# Patient Record
Sex: MALE | Race: WHITE | Employment: OTHER | URBAN - METROPOLITAN AREA
[De-identification: names, ages, dates, MRNs, and addresses within clinical notes are randomized per-mention and may not be internally consistent; named-entity substitution may affect disease eponyms.]

---

## 2021-01-22 ENCOUNTER — IMMUNIZATIONS (OUTPATIENT)
Dept: FAMILY MEDICINE CLINIC | Facility: HOSPITAL | Age: 81
End: 2021-01-22

## 2021-01-22 DIAGNOSIS — Z23 ENCOUNTER FOR IMMUNIZATION: Primary | ICD-10-CM

## 2021-01-22 PROCEDURE — 0001A SARS-COV-2 / COVID-19 MRNA VACCINE (PFIZER-BIONTECH) 30 MCG: CPT

## 2021-01-22 PROCEDURE — 91300 SARS-COV-2 / COVID-19 MRNA VACCINE (PFIZER-BIONTECH) 30 MCG: CPT

## 2021-02-12 ENCOUNTER — IMMUNIZATIONS (OUTPATIENT)
Dept: FAMILY MEDICINE CLINIC | Facility: HOSPITAL | Age: 81
End: 2021-02-12

## 2021-02-12 DIAGNOSIS — Z23 ENCOUNTER FOR IMMUNIZATION: Primary | ICD-10-CM

## 2021-02-12 PROCEDURE — 0002A SARS-COV-2 / COVID-19 MRNA VACCINE (PFIZER-BIONTECH) 30 MCG: CPT

## 2021-02-12 PROCEDURE — 91300 SARS-COV-2 / COVID-19 MRNA VACCINE (PFIZER-BIONTECH) 30 MCG: CPT

## 2023-05-03 ENCOUNTER — CONSULT (OUTPATIENT)
Dept: PAIN MEDICINE | Facility: CLINIC | Age: 83
End: 2023-05-03

## 2023-05-03 ENCOUNTER — APPOINTMENT (OUTPATIENT)
Dept: RADIOLOGY | Facility: CLINIC | Age: 83
End: 2023-05-03

## 2023-05-03 VITALS
BODY MASS INDEX: 29.33 KG/M2 | HEIGHT: 69 IN | HEART RATE: 83 BPM | TEMPERATURE: 98.1 F | WEIGHT: 198 LBS | DIASTOLIC BLOOD PRESSURE: 66 MMHG | SYSTOLIC BLOOD PRESSURE: 128 MMHG

## 2023-05-03 DIAGNOSIS — M25.552 LEFT HIP PAIN: ICD-10-CM

## 2023-05-03 DIAGNOSIS — M54.50 CHRONIC LEFT-SIDED LOW BACK PAIN WITHOUT SCIATICA: ICD-10-CM

## 2023-05-03 DIAGNOSIS — G89.29 CHRONIC LEFT-SIDED LOW BACK PAIN WITHOUT SCIATICA: ICD-10-CM

## 2023-05-03 DIAGNOSIS — M25.552 LEFT HIP PAIN: Primary | ICD-10-CM

## 2023-05-03 RX ORDER — ASPIRIN 325 MG
TABLET ORAL EVERY 24 HOURS
COMMUNITY

## 2023-05-03 NOTE — PROGRESS NOTES
Assessment  1  Left hip pain    2  Chronic left-sided low back pain without sciatica        Plan      At this point the patient's pain persists despite time, relative rest, activity modification, and nonsteroidal anti-inflammatories  Chiropractic treatment aggravate his symptoms and he has tried a course of oral steroids which did reduce his pain  His pain is significantly interfering with his daily living activities  It is appropriate to order radiographs to rule out any significant etiology of his symptoms  We did discuss starting a nonsteroidal inflammatories but he does not like take medications we will hold off until we obtain the x-ray results  Once we obtain results, I will follow-up with the patient, review the results and current symptoms, and discuss the next steps of the treatment plan  He understands to call the office if his pain worsens  My impressions and treatment recommendations were discussed in detail with the patient who verbalized understanding and had no further questions  Discharge instructions were provided  I personally saw and examined the patient and I agree with the above discussed plan of care  This note is created using dictation transcription  It may contain typographical errors, grammatical errors, improperly dictated words, background noise and other errors  Orders Placed This Encounter   Procedures    X-ray lumbar spine 2 or 3 views     Standing Status:   Future     Standing Expiration Date:   5/3/2027     Scheduling Instructions:      Bring along any outside films relating to this procedure   XR hip/pelv 2-3 vws left if performed     Standing Status:   Future     Standing Expiration Date:   5/3/2027     Scheduling Instructions:      Bring along any outside films relating to this procedure             New Medications Ordered This Visit   Medications    aspirin 325 mg tablet     Sig: every 24 hours     Referred By: Westley Ambrosio DO  History of Present Amina Solomon is a 79 y o  male with 3-month history of severe left-sided low back pain  His pain occurred after bowling he went to the chiropractor treatment which completely insignificant exacerbated the symptoms  He went to the emergency department and follow-up with his primary care physician  He has tried nonsteroidal anti-inflammatories and oral steroids this point his pain is mild  It is only occurs when he is moving but denies any weakness  Describes as burning in the left side of his low back without any radiation and no bowel or bladder dysfunction  Lying down and bending increases symptoms while relaxation reduces pain  I have personally reviewed and/or updated the patient's past medical history, past surgical history, family history, social history, current medications, allergies, and vital signs today  Review of Systems   Constitutional: Negative for fever and unexpected weight change  HENT: Negative for trouble swallowing  Eyes: Negative for visual disturbance  Respiratory: Negative for shortness of breath and wheezing  Cardiovascular: Negative for chest pain and palpitations  Gastrointestinal: Negative for constipation, diarrhea, nausea and vomiting  Endocrine: Negative for cold intolerance, heat intolerance and polydipsia  Genitourinary: Negative for difficulty urinating and frequency  Musculoskeletal: Negative for arthralgias, gait problem, joint swelling and myalgias  Skin: Negative for rash  Neurological: Negative for dizziness, seizures, syncope, weakness and headaches  Hematological: Does not bruise/bleed easily  Psychiatric/Behavioral: Negative for dysphoric mood  All other systems reviewed and are negative  There is no problem list on file for this patient  History reviewed  No pertinent past medical history  History reviewed  No pertinent surgical history  History reviewed  No pertinent family history      Social History "    Occupational History    Not on file   Tobacco Use    Smoking status: Former     Types: Cigarettes    Smokeless tobacco: Never   Vaping Use    Vaping Use: Never used   Substance and Sexual Activity    Alcohol use: Never    Drug use: Never    Sexual activity: Not Currently       Current Outpatient Medications on File Prior to Visit   Medication Sig    aspirin 325 mg tablet every 24 hours     No current facility-administered medications on file prior to visit  Allergies   Allergen Reactions    Red Dye - Food Allergy Hives    Wound Dressing Adhesive Hives    Mixed Ragweed Rash and Sneezing       Physical Exam    /66 (BP Location: Left arm, Patient Position: Sitting, Cuff Size: Standard)   Pulse 83   Temp 98 1 °F (36 7 °C)   Ht 5' 9\" (1 753 m)   Wt 89 8 kg (198 lb)   BMI 29 24 kg/m²     Constitutional: normal, well developed, well nourished, alert, in no distress and non-toxic and no overt pain behavior  Eyes: anicteric  HEENT: grossly intact  Neck: supple, symmetric, trachea midline and no masses   Pulmonary:even and unlabored  Cardiovascular:No edema or pitting edema present  Skin:Normal without rashes or lesions and well hydrated  Psychiatric:Mood and affect appropriate  Neurologic:Cranial Nerves II-XII grossly intact  Musculoskeletal:normal,  Inspection: Normal station and gait  Normal lumbar lordotic curve with no significant scoliosis or spinal step-off  Skin intact without erythema  No gross mass or muscle atrophy  Palpation: There is no tenderness to palpation overlying the sacroiliac joint as well as the ischial bursa bilateral  No significant tenderness over the greater trochanteric bursa bilaterally  Motor/Strength: No focal motor deficit appreciated the lower limbs  Reflexes: Deep tendon reflex are diminished but symmetrical bilateral patellar and Achilles  Sensation: Sensation intact to light touch and pinprick in the bilateral lower limbs   Proprioception is intact at " bilateral hallux  Maneuvers: Negative bilateral straight leg raising  Negative Manny's maneuver  Imaging  CT Abdominal Pelvis @ [facility] 4-16-23  IMPRESSION:   No acute findings  Remainder as above  Dictated and authenticated by: Maira Haynes MD On 04/16/2023  07:46:25    Preliminary interpretation was provided by [facility]  I personally reviewed the examination and agree with the reported findings  In the preceding 12 month period prior to the current study, the patient has received 1 CT scans and cardiac nuclear medicine myocardial perfusion (nuclear stress test) studies  I have personally reviewed pertinent films in PACS

## 2023-06-05 ENCOUNTER — OFFICE VISIT (OUTPATIENT)
Dept: PAIN MEDICINE | Facility: CLINIC | Age: 83
End: 2023-06-05
Payer: COMMERCIAL

## 2023-06-05 VITALS
TEMPERATURE: 97.8 F | WEIGHT: 200 LBS | HEART RATE: 76 BPM | HEIGHT: 69 IN | SYSTOLIC BLOOD PRESSURE: 126 MMHG | BODY MASS INDEX: 29.62 KG/M2 | DIASTOLIC BLOOD PRESSURE: 80 MMHG

## 2023-06-05 DIAGNOSIS — M47.816 LUMBAR SPONDYLOSIS: Primary | ICD-10-CM

## 2023-06-05 DIAGNOSIS — M51.36 LUMBAR DEGENERATIVE DISC DISEASE: ICD-10-CM

## 2023-06-05 PROCEDURE — 99214 OFFICE O/P EST MOD 30 MIN: CPT | Performed by: PHYSICIAN ASSISTANT

## 2023-06-05 NOTE — PROGRESS NOTES
Assessment:  1  Lumbar spondylosis    2  Lumbar degenerative disc disease        Plan:  While the patient was in the office today, I did have a thorough conversation regarding their chronic pain syndrome, medication management, and treatment plan options  Today I spent time reviewing the recent x-rays of the lumbar spine and left hip  He continues with pain primarily in the left lumbar region without any radicular pain pattern  He states that since our last visit, his pain has eased up some and he finds relief with the counter anti-inflammatories  Spent some time discussing the neck steps in his treatment plan which could consist of either injection therapy to address the axial component with diagnostic medial branch blocks  The moment I mentioned injection therapy the patient states that he did not want to even discuss this modality of treatment  He has exhausted conservative efforts with chiropractic care but he states that he wishes to resume that type of therapy  I have placed an order for an MRI of the lumbar spine to further evaluate the discs and nerve roots  The patient states that he will likely just take a watch and wait approach before scheduling the MRI  At this point the patient will follow-up with us on a as needed basis if the pain changes or worsens  The patient was advised to contact the office should their symptoms worsen in the interim  The patient was agreeable and verbalized an understanding  History of Present Illness: The patient is a 79 y o  male last seen on 5/3/2023 who presents for a follow up office visit in regards to low back pain  The patient currently reports left low back pain that he presently rates a 2 out of 10 on the pain scale and describes it as a constant dull and aching pain with intermittent paresthesias in that same region  He has occasional referred symptoms into the left hip but denies any lower extremity pain, paresthesias or weakness    Last "visit was his initial consultation at which point Dr Deya Wren ordered x-rays of the left hip and lumbar spine  Left hip x-ray revealed mild osteoarthritis and the lumbar spine x-ray revealed facet arthropathy at L4-5 and L5-S1 with intervertebral disc space narrowing at all levels  He states that since his last visit with us his pain has eased up some  I have personally reviewed and/or updated the patient's past medical history, past surgical history, family history, social history, current medications, allergies, and vital signs today  Review of Systems:    Review of Systems   Respiratory: Negative for shortness of breath  Cardiovascular: Negative for chest pain  Gastrointestinal: Negative for constipation, diarrhea, nausea and vomiting  Musculoskeletal: Negative for arthralgias, gait problem, joint swelling and myalgias  Skin: Negative for rash  Neurological: Positive for dizziness  Negative for seizures and weakness  All other systems reviewed and are negative  History reviewed  No pertinent past medical history  History reviewed  No pertinent surgical history  History reviewed  No pertinent family history      Social History     Occupational History   • Not on file   Tobacco Use   • Smoking status: Former     Types: Cigarettes   • Smokeless tobacco: Never   Vaping Use   • Vaping Use: Never used   Substance and Sexual Activity   • Alcohol use: Never   • Drug use: Never   • Sexual activity: Not Currently         Current Outpatient Medications:   •  aspirin 325 mg tablet, every 24 hours, Disp: , Rfl:     Allergies   Allergen Reactions   • Red Dye - Food Allergy Hives   • Wound Dressing Adhesive Hives   • Mixed Ragweed Rash and Sneezing       Physical Exam:    /80 (BP Location: Left arm, Patient Position: Sitting, Cuff Size: Standard)   Pulse 76   Temp 97 8 °F (36 6 °C)   Ht 5' 9\" (1 753 m)   Wt 90 7 kg (200 lb)   BMI 29 53 kg/m²     Constitutional:normal, well developed, " well nourished, alert, in no distress and non-toxic and no overt pain behavior  Eyes:anicteric  HEENT:grossly intact  Neck:supple, symmetric, trachea midline and no masses   Pulmonary:even and unlabored  Cardiovascular:No edema or pitting edema present  Skin:Normal without rashes or lesions and well hydrated  Psychiatric:Mood and affect appropriate  Neurologic:Cranial Nerves II-XII grossly intact  Musculoskeletal: Tightness along the left lumbar paraspinal muscles and left lumbar facet joints, limited range of motion with extension        Imaging  MRI lumbar spine without contrast    (Results Pending)         Orders Placed This Encounter   Procedures   • MRI lumbar spine without contrast